# Patient Record
Sex: FEMALE | Race: WHITE
[De-identification: names, ages, dates, MRNs, and addresses within clinical notes are randomized per-mention and may not be internally consistent; named-entity substitution may affect disease eponyms.]

---

## 2019-03-12 ENCOUNTER — HOSPITAL ENCOUNTER (EMERGENCY)
Dept: HOSPITAL 80 - FED | Age: 54
Discharge: HOME | End: 2019-03-12
Payer: MEDICAID

## 2019-03-12 DIAGNOSIS — F17.200: ICD-10-CM

## 2019-03-12 DIAGNOSIS — R91.8: ICD-10-CM
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
## 2019-03-12 DIAGNOSIS — S20.211A: Primary | ICD-10-CM

## 2019-03-12 NOTE — EDPHY
General


Time Seen by Provider: 03/12/19 14:33


Narrative: 





CLINICAL IMPRESSION: Right anterior rib contusion, pulmonary nodules    


_________________


ASSESSMENT/PLAN:


[ 52 yo female presents to the ER with 2 days of right anterior rib pain after 

leaning over the seat in her car and feeling a pop in her ribs.  No SOB, CP, 

hypoxia, or respiratory distress and patient has stable VS on arrival.  CXR 

with rib films shows no evidence of rib fracture, pneumothorax but does show a 

right middle lobe irregular nodule and patient is a smoker.  She recollects 

being told by CT in [facility] > 5 yrs ago that she had RLL nodules as 

well but never had repeat imaging.  CT today read by radiology with numerous 

smooth well marginated nodules in right middle lobe.  No rib fx on CT.  Patient 

was advised to have repeat CT in 6-12 months through PCP.  I recommended she 

obtain her imaging results from the hospital in [place] and bring them to the 

radiology dept for comparison.  PCP referrals given, patient refused and 

incentive spirometer.  Warning signs for ER return outlined in d/c. 


_________________


DIFFERENTIAL DX:


[ Differential includes but not limited to rib fracture, rib contusion, 

pneumothorax, musculoskeletal chest wall injury]


_________________


ED PROCEDURES:


See lab and/or imaging results below





ED COURSE:


X-ray results discussed with Dr. Oppenheimer.  Patient reportedly has an 

abnormal, 3 mm irregular nodule in the right middle lung.  I reviewed images 

with the patient.  She reports she had an x-ray and CT scan at least 4 years 

ago after she had secondhand smoke exposure.  Patient is very upset that she is 

labeled a smoker but does admit that she smokes every day.  She is agreeable to 

repeat CT scan today as she did not have this repeated 4 years ago.  She is 

asking for a cigarette, water and food.  I offered a nicotine patch which she 

has declined.  Will give water and snacks. CT ordered


4:20 p.m.:  CT scan results discussed with Dr. Kaur.  No identified rib 

fracture.  Thoracic spine clear.  Patient has multiple smooth marginated lung 

nodules the largest of which measuring 8 x 6 mm.  According to [employer] 

society recommendations, patient should have repeat CT scan in 6-12 months.  

Patient can also attempt to obtain prior CT results from radiologist in 

[place] or again and our radiology department is happy to compare these.  This 

recommendation will be relayed to the patient.


_________________


CHIEF COMPLAINT: [Right-sided rib pain ]


_________________


HPI:


53-year-old female presents to the emergency department with 2 days of right 

anterior lower rib pain.  Patient reports she was leaning against a seat in a 

car, reaching over the seat for something when she felt a pop in the right 

anterior ribs.  She has noted increasing pain in this region over last 2 days.  

No associated shortness of breath or chest pain.  No abdominal pain, nausea, 

vomiting.  She has not seen anyone for this.  She reports no underlying 

pulmonary disease or asthma.  She has not taken anything for pain.


_________________


PAST MEDICAL HISTORY: 


Thyroid problems [See triage summary and nurse notes for addition applicable 

history ]





Pertinent Past Surgical History:  None reported





Family History:  Noncontributory





Social History:  Daily smoker


_________________


REVIEW OF SYSTEMS:


A full 10 point review of systems was negative except for those mentioned in 

HPI. 


_________________


PHYSICAL EXAM:


General Appearance:  [Alert, oriented, appropriate, cooperative, NAD, well 

hydrated, non-toxic appearing, tachycardic,, no hypoxia.]


Neck: [Supple, nontender, no lymphadenopathy, no midline pain, FROM, no 

meningismus.]


Respiratory:  [There are no retractions, lungs are clear to auscultation.  

Reproducible pain along the right anterior rib cage just beneath the right 

breast.  No obvious swelling or contusion.  No palpable crepitus or step-off.


Cardiac:  [Regular rate and rhythm, no murmurs or gallops.]


Gastrointestinal: [Abdomen is soft, nontender, bowel sounds normal, no masses/

hernia, no rigidity, guarding or focal peritoneal findings.  Negative Cabrales 

sign, no right upper quadrant tenderness.]


Skin: [Warm, dry, no rashes, no nodules on palpation.]


_________________


MEDICAL DECISION MAKING:


Patient was seen independently. Secondary supervising physician at time of 

evaluation was: [Dr. Altman ].


Diagnosis: Right anterior rib contusion, pulmonary nodules. New, requires workup


Summary: [See Assessment and Plan for summary of ED visit ]


Independent visualization of images, tracing, or specimens: Yes


Discussed patient with another provider:Radiologist





Patient Progress: Stable for D/C 





- History


Smoking Status: Heavy smoker





- Objective


Vital Signs: 


 Initial Vital Signs











Temperature (C)  36.7 C   03/12/19 13:09


 


Heart Rate  105 H  03/12/19 13:09


 


Respiratory Rate  16   03/12/19 13:09


 


O2 Sat (%)  96   03/12/19 13:09








 











O2 Delivery Mode               Room Air














Allergies/Adverse Reactions: 


 





naproxen [From Naprosyn] Allergy (Verified 03/12/19 13:09)


 


Penicillins Allergy (Verified 03/12/19 13:09)


 


Sulfa (Sulfonamide Antibiotics) Allergy (Verified 03/12/19 13:09)


 











Departure





- Departure


Disposition: Home, Routine, Self-Care


Clinical Impression: 


 Contusion of rib on right side, Pulmonary nodules





Condition: Fair


Instructions:  Rib Contusion (ED)


Additional Instructions: 


DISCHARGE INSTRUCTIONS FROM YOUR DOCTOR 


Thank you for visiting our emergency department today. You were treated by a 

physician assistant today and your case was reviewed with our ED Attending 

physician.  Please keep in mind that discharge from the emergency department 

does not mean that there is nothing wrong - it simply means that we have not 

identified an emergency condition that requires further evaluation or treatment 

in the hospital. You should always plan to follow up with primary care for re-

evaluation of your condition in the next 2-3 days. If you have been referred to 

a specialist, please call as soon as possible (today or tomorrow) to schedule 

your follow up appointment at the appropriate time. 





DIAGNOSTIC WORKUP IN THE EMERGENCY DEPARTMENT TONIGHT INCLUDED CHEST X-RAY WITH 

RIB SERIES AS WELL AS CT SCAN OF THE CHEST.  BOTH IMAGES WERE READ BY OUR 

RADIOLOGIST.  CT SCAN SHOWS NO RIB FRACTURE.  THORACIC SPINE IS WITHOUT 

FRACTURE.  YOU HAVE SEVERAL SMOOTH, WELL-MARGINATED LUNG NODULES, THE LARGEST 

OF WISH MEASURING 8 X 6 MM IN THE RIGHT MIDDLE LOBE.  BASED ON RADIOLOGIC 

RECOMMENDATIONS, A REPEAT CT SCAN IS ENCOURAGED IN 6-12 MONTHS.  THIS CAN BE 

ARRANGED THROUGH A PRIMARY CARE DOCTOR.  IF YOU DO NOT HAVE 1 A REFERRAL WAS 

GIVEN.  YOU CAN ALSO OBTAIN PRIOR CT RESULTS FROM [facility] AND BRING THEM TO 

THE RADIOLOGY IMAGING DEPARTMENT HERE AT THE HOSPITAL ANY TIME FOR OUR 

RADIOLOGIST TO COMPARE THEM TO TODAY'S CT SCAN.  YOU DO NOT NEED AN APPOINTMENT 

FOR THIS.  RIB CONTUSIONS CAN TAKE 6-12 WEEKS TO HEAL.  PLEASE AVOID ACTIVITIES 

THAT EXACERBATE YOUR PAIN.  FOLLOW UP WITH A PRIMARY CARE DOCTOR.  RETURN TO 

THE EMERGENCY DEPARTMENT FOR WORSENING PAIN, COUGH, FEVER, SHORTNESS OF BREATH, 

OR ANY OTHER CONCERN. 





People present with illnesses and injuries in different ways, and it is always 

possible that we have missed something. You may always return for re-evaluation 

if symptoms worsen or if they are not improving or if you develop new/different 

symptoms. 


Again, thank you for choosing our emergency department. We hope that you feel 

better.


Referrals: 


NONE *PRIMARY CARE P,. [Primary Care Provider] - As per Instructions


[facility],. [Clinic] - 2-3 days, if not improved